# Patient Record
Sex: MALE | URBAN - METROPOLITAN AREA
[De-identification: names, ages, dates, MRNs, and addresses within clinical notes are randomized per-mention and may not be internally consistent; named-entity substitution may affect disease eponyms.]

---

## 2023-06-29 ENCOUNTER — OFFICE VISIT (OUTPATIENT)
Facility: LOCATION | Age: 73
End: 2023-06-29
Payer: COMMERCIAL

## 2023-06-29 DIAGNOSIS — D31.31 BENIGN NEOPLASM OF RIGHT CHOROID: Primary | ICD-10-CM

## 2023-06-29 DIAGNOSIS — H25.13 AGE-RELATED NUCLEAR CATARACT, BILATERAL: ICD-10-CM

## 2023-06-29 DIAGNOSIS — H43.393 OTHER VITREOUS OPACITIES, BILATERAL: ICD-10-CM

## 2023-06-29 PROCEDURE — 92014 COMPRE OPH EXAM EST PT 1/>: CPT | Performed by: OPHTHALMOLOGY

## 2023-06-29 ASSESSMENT — INTRAOCULAR PRESSURE
OD: 20
OS: 15
OD: 15
OS: 22

## 2023-06-29 ASSESSMENT — KERATOMETRY
OD: 45.13
OS: 44.81

## 2023-06-29 NOTE — IMPRESSION/PLAN
Impression: Other vitreous opacities, bilateral.  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 Described scenarios when they are likely to be most prominent. Reassurance was given to the patient.

## 2023-06-29 NOTE — IMPRESSION/PLAN
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 Advise continued observation. Discussed in detail with the patient. The patient voiced understanding.

## 2024-06-06 ENCOUNTER — OFFICE VISIT (OUTPATIENT)
Facility: LOCATION | Age: 74
End: 2024-06-06
Payer: COMMERCIAL

## 2024-06-06 DIAGNOSIS — D31.31 BENIGN NEOPLASM OF RIGHT CHOROID: ICD-10-CM

## 2024-06-06 DIAGNOSIS — H25.13 AGE-RELATED NUCLEAR CATARACT, BILATERAL: Primary | ICD-10-CM

## 2024-06-06 PROCEDURE — 92014 COMPRE OPH EXAM EST PT 1/>: CPT | Performed by: OPHTHALMOLOGY

## 2024-06-06 ASSESSMENT — VISUAL ACUITY
OS: 20/20
OD: 20/20

## 2024-06-06 ASSESSMENT — INTRAOCULAR PRESSURE
OD: 16
OS: 16

## 2024-06-06 ASSESSMENT — KERATOMETRY
OS: 45.19
OD: 45.19

## 2025-06-17 ENCOUNTER — OFFICE VISIT (OUTPATIENT)
Facility: LOCATION | Age: 75
End: 2025-06-17
Payer: COMMERCIAL

## 2025-06-17 DIAGNOSIS — H25.13 AGE-RELATED NUCLEAR CATARACT, BILATERAL: Primary | ICD-10-CM

## 2025-06-17 DIAGNOSIS — D31.31 BENIGN NEOPLASM OF RIGHT CHOROID: ICD-10-CM

## 2025-06-17 PROCEDURE — 92014 COMPRE OPH EXAM EST PT 1/>: CPT | Performed by: OPHTHALMOLOGY

## 2025-06-17 ASSESSMENT — VISUAL ACUITY
OS: 20/30
OD: 20/20

## 2025-06-17 ASSESSMENT — KERATOMETRY
OS: 44.75
OD: 45.87

## 2025-06-17 ASSESSMENT — INTRAOCULAR PRESSURE
OD: 17
OS: 15